# Patient Record
Sex: FEMALE | URBAN - METROPOLITAN AREA
[De-identification: names, ages, dates, MRNs, and addresses within clinical notes are randomized per-mention and may not be internally consistent; named-entity substitution may affect disease eponyms.]

---

## 2020-06-18 ENCOUNTER — OFFICE VISIT (OUTPATIENT)
Dept: URBAN - METROPOLITAN AREA CLINIC 88 | Facility: CLINIC | Age: 70
End: 2020-06-18
Payer: COMMERCIAL

## 2020-06-18 DIAGNOSIS — E11.3293 TYPE 2 DIAB W MILD NONPRLF DIABETIC RTNOP W/O MACULAR EDEMA, BILATERAL: Primary | ICD-10-CM

## 2020-06-18 DIAGNOSIS — H25.813 COMBINED FORMS OF AGE-RELATED CATARACT, BILATERAL: ICD-10-CM

## 2020-06-18 DIAGNOSIS — H11.012 AMYLOID PTERYGIUM OF LEFT EYE: ICD-10-CM

## 2020-06-18 DIAGNOSIS — H43.811 VITREOUS DEGENERATION, RIGHT EYE: ICD-10-CM

## 2020-06-18 PROCEDURE — 92004 COMPRE OPH EXAM NEW PT 1/>: CPT | Performed by: OPHTHALMOLOGY

## 2020-06-18 ASSESSMENT — INTRAOCULAR PRESSURE
OD: 15
OS: 15

## 2020-06-18 ASSESSMENT — VISUAL ACUITY
OS: 20/40
OD: 20/50

## 2020-06-18 ASSESSMENT — KERATOMETRY
OD: 43.38
OS: 43.50

## 2020-06-18 NOTE — IMPRESSION/PLAN
Impression: Combined forms of age-related cataract, bilateral: H25.813. Plan: Cataracts account for the patient's complaints. Discussed all risks, benefits, procedures and recovery. Patient understands changing glasses will not improve vision. Patient desires to have surgery, recommend phacoemulsification with intraocular lens. Surgical risks and benefits were discussed, explained and to patient. RL2 Final post operative refractive decision will be made by Operative Surgeon.  Pred-Moxi-Nepafenac/generic drops

## 2020-06-18 NOTE — IMPRESSION/PLAN
Impression: Type 2 diab w mild nonprlf diabetic rtnop w/o macular edema, bilateral: V32.8720. Plan: Discussed diagnosis in detail with patient. Emphasized blood sugar control. Keep future appts with PCP. Pt will be needing PRP laser OD, Discussed Risks/benefits of laser tx. Pt verbalized understating. After Laser treatment OD pt can proceed with cataract surgery.

## 2020-06-18 NOTE — IMPRESSION/PLAN
Impression: Amyloid pterygium of left eye: H11.012. Plan: Discussed diagnosis in detail with patient. No treatment is required at this time. UV protection recommended.

## 2020-06-18 NOTE — IMPRESSION/PLAN
Impression: Vitreous degeneration, right eye: H43.811. Plan: Discussed diagnosis in detail with patient. There is no evidence of retinal pathology. All signs and risks of retinal detachment and tears were discussed in detail. Patient was advised if any changes of VA, flashes, floaters or if curtain fall in field of [address] Patient instructed to call the office immediately if any symptoms noted.